# Patient Record
Sex: MALE | Race: WHITE | HISPANIC OR LATINO | Employment: FULL TIME | URBAN - METROPOLITAN AREA
[De-identification: names, ages, dates, MRNs, and addresses within clinical notes are randomized per-mention and may not be internally consistent; named-entity substitution may affect disease eponyms.]

---

## 2019-11-07 ENCOUNTER — HOSPITAL ENCOUNTER (EMERGENCY)
Facility: CLINIC | Age: 24
Discharge: HOME | End: 2019-11-07
Attending: NURSE PRACTITIONER | Admitting: NURSE PRACTITIONER
Payer: OTHER MISCELLANEOUS

## 2019-11-07 ENCOUNTER — APPOINTMENT (OUTPATIENT)
Dept: GENERAL RADIOLOGY | Facility: CLINIC | Age: 24
End: 2019-11-07
Attending: NURSE PRACTITIONER
Payer: OTHER MISCELLANEOUS

## 2019-11-07 VITALS
DIASTOLIC BLOOD PRESSURE: 86 MMHG | TEMPERATURE: 98.2 F | RESPIRATION RATE: 18 BRPM | OXYGEN SATURATION: 98 % | HEIGHT: 66 IN | BODY MASS INDEX: 25.44 KG/M2 | SYSTOLIC BLOOD PRESSURE: 133 MMHG | HEART RATE: 77 BPM

## 2019-11-07 DIAGNOSIS — S61.411A LACERATION OF RIGHT HAND WITHOUT FOREIGN BODY, INITIAL ENCOUNTER: ICD-10-CM

## 2019-11-07 PROCEDURE — 12004 RPR S/N/AX/GEN/TRK7.6-12.5CM: CPT

## 2019-11-07 PROCEDURE — 73130 X-RAY EXAM OF HAND: CPT | Mod: LT

## 2019-11-07 PROCEDURE — 99283 EMERGENCY DEPT VISIT LOW MDM: CPT

## 2019-11-07 ASSESSMENT — ENCOUNTER SYMPTOMS: WOUND: 1

## 2019-11-07 NOTE — ED AVS SNAPSHOT
Emergency Department  [address] [place] [address]  Phone:  [phone]  Fax:  [phone]                                    Paco Ash   MRN: [id number]    Department:   Emergency Department   Date of Visit:  11/7/2019           After Visit Summary Signature Page    I have received my discharge instructions, and my questions have been answered. I have discussed any challenges I see with this plan with the nurse or doctor.    ..........................................................................................................................................  Patient/Patient Representative Signature      ..........................................................................................................................................  Patient Representative Print Name and Relationship to Patient    ..................................................               ................................................  Date                                   Time    ..........................................................................................................................................  Reviewed by Signature/Title    ...................................................              ..............................................  Date                                               Time          22EPIC Rev 08/18

## 2019-11-07 NOTE — ED PROVIDER NOTES
"  History     Chief Complaint:  Laceration    HPI  An  was used as an  for the following HPI.  Paco Ash is a 24 year old male who presents with a laceration. The patient was cutting a countertop made of granite that lacerated his left medial hand while at work today. Here, he states his pain is at a 4/10. He notes numbness to his pinky finger which is just distal to the wound. He denies weakness with extension or flexion.  He has not taken any medication for his symptoms.  The patient's last tetanus shot was in 2015.     Allergies:  The patient has no known drug allergies.    Medications:    The patient is not currently taking any prescribed medications.    Past Medical History:    Respiratory failure     Past Surgical History:    The patient does not have any pertinent past surgical history.    Family History:    No past pertinent family history.    Social History:  The injury occurred while at work   Arrives with: Coworker     Review of Systems   Skin: Positive for wound.   All other systems reviewed and are negative.    Physical Exam     Patient Vitals for the past 24 hrs:   BP Temp Pulse Resp SpO2 Height   11/07/19 1216 131/87 98.2  F (36.8  C) 99 18 98 % 1.676 m (5' 6\")     Physical Exam  Nursing notes reviewed. Vitals reviewed.  General: Alert. Well kept.  Eyes:  Conjunctiva non-injected, non-icteric.  Neck/Throat: Moist mucous membranes. Normal voice.  Cardiac: Regular rhythm. Normal heart sounds.  Pulmonary: Clear and equal breath sounds bilaterally.   Musculoskeletal: Normal gross range of motion of all 4 extremities. Full strength flexion and extension of all 5 fingers at each IP joint and no pain with ROM of wrist.   Neurological: Alert and oriented x4.   Skin: Warm and dry. 8 cm subcutaneous laceration to ulnar aspect of left hand over the 5th metacarpal with no bone or tendon exposure.   Psych: Affect normal. Good eye contact.    Emergency Department Course "   Imaging:  Radiographic findings were communicated with the patient who voiced understanding of the findings.    XR Hand 3 views, left:   No radiopaque foreign body or acute fracture identified. as per radiology.     Procedures:    Procedure: Laceration Repair        LACERATION:  A subcutanous clean 8 cm laceration.      LOCATION:  Left medial hand.       FUNCTION:  Distally sensation, circulation, motor and tendon function are intact.      ANESTHESIA:  Local using 0.5% bupivacaine total of 5 mLs      PREPARATION:  Irrigation and Scrubbing with Normal Saline and Shur Clens      DEBRIDEMENT:  no debridement      CLOSURE:  Wound was closed with One Layer.  Skin closed with 12 x 4.0 Ethylon using interrupted sutures.            Emergency Department Course:  1217 I performed an exam of the patient as documented above.   1440 I rechecked the patient and discussed the results of their workup thus far.     Findings and plan explained. Patient discharged home with instructions regarding supportive care and reasons to return. The importance of close follow-up was reviewed. I personally reviewed the workup results with them and answered all related questions prior to discharge.    Impression & Plan    Medical Decision Making:  Paco Ash is a 24 year old male who presents for a laceration.  The wound was carefully evaluated and explored.  The laceration was closed with sutures as noted herein.  There is no evidence of muscular, tendon, or bony damage with this laceration.  Possible complications (infection, scarring) were reviewed with the patient.  Follow up with primary care will be indicated for suture removal as noted in the discharge section.  I also discussed signs of infection including redness, warmth, foul-smelling drainage and worsening pain and instructed the patient to return promptly to the ER for re-evaluation should any of these develop.  No indication for antibiotics.  He was also given referral  to hand orthopedist due to complex nature of the laceration and his lateral 5th finger paresthesia.     Diagnosis:    ICD-10-CM    1. Laceration of right hand without foreign body, initial encounter S61.411A      Disposition:  discharged to home.    Scribe Disclosure: I, Vin Olsen, am serving as a scribe on 11/7/2019 at 12:17 PM to personally document services performed by Kimi Phan CNP based on my observations and the provider's statements to me.      Kimi Phan CNP  11/07/19 [id number]

## 2019-11-07 NOTE — LETTER
November 7, 2019      To Whom It May Concern:      Paco Ash was seen in our Emergency Department today, 11/07/19. He may not lift >#5 or use left hand until sutures removed in 10-14 days.  He may return to work when improved.    Sincerely,        Kimi Phan, CNP

## 2020-03-10 ENCOUNTER — HEALTH MAINTENANCE LETTER (OUTPATIENT)
Age: 25
End: 2020-03-10

## 2020-12-27 ENCOUNTER — HEALTH MAINTENANCE LETTER (OUTPATIENT)
Age: 25
End: 2020-12-27

## 2021-04-24 ENCOUNTER — HEALTH MAINTENANCE LETTER (OUTPATIENT)
Age: 26
End: 2021-04-24

## 2021-10-04 ENCOUNTER — HEALTH MAINTENANCE LETTER (OUTPATIENT)
Age: 26
End: 2021-10-04

## 2022-05-15 ENCOUNTER — HEALTH MAINTENANCE LETTER (OUTPATIENT)
Age: 27
End: 2022-05-15

## 2022-09-11 ENCOUNTER — HEALTH MAINTENANCE LETTER (OUTPATIENT)
Age: 27
End: 2022-09-11

## 2022-12-10 ENCOUNTER — HOSPITAL ENCOUNTER (INPATIENT)
Facility: CLINIC | Age: 27
LOS: 3 days | Discharge: HOME | DRG: 387 | End: 2022-12-13
Attending: EMERGENCY MEDICINE | Admitting: STUDENT IN AN ORGANIZED HEALTH CARE EDUCATION/TRAINING PROGRAM

## 2022-12-10 ENCOUNTER — APPOINTMENT (OUTPATIENT)
Dept: CT IMAGING | Facility: CLINIC | Age: 27
DRG: 387 | End: 2022-12-10
Attending: EMERGENCY MEDICINE

## 2022-12-10 DIAGNOSIS — R19.7 BLOODY DIARRHEA: ICD-10-CM

## 2022-12-10 DIAGNOSIS — K51.00 PANCOLITIS (H): ICD-10-CM

## 2022-12-10 LAB
ALBUMIN SERPL BCG-MCNC: 4 G/DL (ref 3.5–5.2)
ALP SERPL-CCNC: 98 U/L (ref 40–129)
ALT SERPL W P-5'-P-CCNC: 29 U/L (ref 10–50)
ANION GAP SERPL CALCULATED.3IONS-SCNC: 11 MMOL/L (ref 7–15)
AST SERPL W P-5'-P-CCNC: 30 U/L (ref 10–50)
BASOPHILS # BLD AUTO: 0.1 10E3/UL (ref 0–0.2)
BASOPHILS NFR BLD AUTO: 0 %
BILIRUB SERPL-MCNC: 0.3 MG/DL
BUN SERPL-MCNC: 7.4 MG/DL (ref 6–20)
C DIFF TOX B STL QL: NEGATIVE
C DIFF TOX B STL QL: NEGATIVE
CALCIUM SERPL-MCNC: 9.2 MG/DL (ref 8.6–10)
CHLORIDE SERPL-SCNC: 103 MMOL/L (ref 98–107)
CREAT SERPL-MCNC: 1 MG/DL (ref 0.67–1.17)
CRP SERPL-MCNC: 20.82 MG/L
DEPRECATED HCO3 PLAS-SCNC: 25 MMOL/L (ref 22–29)
EOSINOPHIL # BLD AUTO: 1.5 10E3/UL (ref 0–0.7)
EOSINOPHIL NFR BLD AUTO: 13 %
ERYTHROCYTE [DISTWIDTH] IN BLOOD BY AUTOMATED COUNT: 12.6 % (ref 10–15)
ERYTHROCYTE [SEDIMENTATION RATE] IN BLOOD BY WESTERGREN METHOD: 50 MM/HR (ref 0–15)
FLUAV RNA SPEC QL NAA+PROBE: NEGATIVE
FLUBV RNA RESP QL NAA+PROBE: NEGATIVE
GFR SERPL CREATININE-BSD FRML MDRD: >90 ML/MIN/1.73M2
GLUCOSE SERPL-MCNC: 107 MG/DL (ref 70–99)
HCT VFR BLD AUTO: 44.8 % (ref 40–53)
HGB BLD-MCNC: 14.5 G/DL (ref 13.3–17.7)
IMM GRANULOCYTES # BLD: 0.1 10E3/UL
IMM GRANULOCYTES NFR BLD: 1 %
LACTATE SERPL-SCNC: 0.6 MMOL/L (ref 0.7–2)
LIPASE SERPL-CCNC: 22 U/L (ref 13–60)
LYMPHOCYTES # BLD AUTO: 1.6 10E3/UL (ref 0.8–5.3)
LYMPHOCYTES NFR BLD AUTO: 14 %
MCH RBC QN AUTO: 28.2 PG (ref 26.5–33)
MCHC RBC AUTO-ENTMCNC: 32.4 G/DL (ref 31.5–36.5)
MCV RBC AUTO: 87 FL (ref 78–100)
MONOCYTES # BLD AUTO: 0.7 10E3/UL (ref 0–1.3)
MONOCYTES NFR BLD AUTO: 6 %
NEUTROPHILS # BLD AUTO: 8 10E3/UL (ref 1.6–8.3)
NEUTROPHILS NFR BLD AUTO: 66 %
NRBC # BLD AUTO: 0 10E3/UL
NRBC BLD AUTO-RTO: 0 /100
PLATELET # BLD AUTO: 445 10E3/UL (ref 150–450)
POTASSIUM SERPL-SCNC: 4 MMOL/L (ref 3.4–5.3)
PROT SERPL-MCNC: 7.7 G/DL (ref 6.4–8.3)
RBC # BLD AUTO: 5.15 10E6/UL (ref 4.4–5.9)
RSV RNA SPEC NAA+PROBE: NEGATIVE
SARS-COV-2 RNA RESP QL NAA+PROBE: NEGATIVE
SODIUM SERPL-SCNC: 139 MMOL/L (ref 136–145)
WBC # BLD AUTO: 12 10E3/UL (ref 4–11)

## 2022-12-10 PROCEDURE — 250N000011 HC RX IP 250 OP 636: Performed by: EMERGENCY MEDICINE

## 2022-12-10 PROCEDURE — 120N000001 HC R&B MED SURG/OB

## 2022-12-10 PROCEDURE — 87493 C DIFF AMPLIFIED PROBE: CPT | Performed by: INTERNAL MEDICINE

## 2022-12-10 PROCEDURE — 258N000003 HC RX IP 258 OP 636: Performed by: STUDENT IN AN ORGANIZED HEALTH CARE EDUCATION/TRAINING PROGRAM

## 2022-12-10 PROCEDURE — 86140 C-REACTIVE PROTEIN: CPT | Performed by: STUDENT IN AN ORGANIZED HEALTH CARE EDUCATION/TRAINING PROGRAM

## 2022-12-10 PROCEDURE — 80053 COMPREHEN METABOLIC PANEL: CPT | Performed by: EMERGENCY MEDICINE

## 2022-12-10 PROCEDURE — 85652 RBC SED RATE AUTOMATED: CPT | Performed by: STUDENT IN AN ORGANIZED HEALTH CARE EDUCATION/TRAINING PROGRAM

## 2022-12-10 PROCEDURE — 36415 COLL VENOUS BLD VENIPUNCTURE: CPT | Performed by: STUDENT IN AN ORGANIZED HEALTH CARE EDUCATION/TRAINING PROGRAM

## 2022-12-10 PROCEDURE — 99285 EMERGENCY DEPT VISIT HI MDM: CPT | Mod: 25

## 2022-12-10 PROCEDURE — 87506 IADNA-DNA/RNA PROBE TQ 6-11: CPT | Performed by: STUDENT IN AN ORGANIZED HEALTH CARE EDUCATION/TRAINING PROGRAM

## 2022-12-10 PROCEDURE — 85025 COMPLETE CBC W/AUTO DIFF WBC: CPT | Performed by: EMERGENCY MEDICINE

## 2022-12-10 PROCEDURE — C9803 HOPD COVID-19 SPEC COLLECT: HCPCS

## 2022-12-10 PROCEDURE — 258N000003 HC RX IP 258 OP 636: Performed by: EMERGENCY MEDICINE

## 2022-12-10 PROCEDURE — 83993 ASSAY FOR CALPROTECTIN FECAL: CPT | Performed by: INTERNAL MEDICINE

## 2022-12-10 PROCEDURE — 96360 HYDRATION IV INFUSION INIT: CPT

## 2022-12-10 PROCEDURE — 87637 SARSCOV2&INF A&B&RSV AMP PRB: CPT | Performed by: STUDENT IN AN ORGANIZED HEALTH CARE EDUCATION/TRAINING PROGRAM

## 2022-12-10 PROCEDURE — 87209 SMEAR COMPLEX STAIN: CPT | Performed by: INTERNAL MEDICINE

## 2022-12-10 PROCEDURE — 250N000009 HC RX 250: Performed by: EMERGENCY MEDICINE

## 2022-12-10 PROCEDURE — 36415 COLL VENOUS BLD VENIPUNCTURE: CPT | Performed by: EMERGENCY MEDICINE

## 2022-12-10 PROCEDURE — 74177 CT ABD & PELVIS W/CONTRAST: CPT

## 2022-12-10 PROCEDURE — 83605 ASSAY OF LACTIC ACID: CPT | Performed by: STUDENT IN AN ORGANIZED HEALTH CARE EDUCATION/TRAINING PROGRAM

## 2022-12-10 PROCEDURE — 87493 C DIFF AMPLIFIED PROBE: CPT | Performed by: EMERGENCY MEDICINE

## 2022-12-10 PROCEDURE — 83690 ASSAY OF LIPASE: CPT | Performed by: EMERGENCY MEDICINE

## 2022-12-10 PROCEDURE — 99223 1ST HOSP IP/OBS HIGH 75: CPT | Mod: AI | Performed by: STUDENT IN AN ORGANIZED HEALTH CARE EDUCATION/TRAINING PROGRAM

## 2022-12-10 RX ORDER — ACETAMINOPHEN 325 MG/1
650 TABLET ORAL EVERY 6 HOURS PRN
Status: DISCONTINUED | OUTPATIENT
Start: 2022-12-10 | End: 2022-12-13 | Stop reason: HOSPADM

## 2022-12-10 RX ORDER — NALOXONE HYDROCHLORIDE 0.4 MG/ML
0.2 INJECTION, SOLUTION INTRAMUSCULAR; INTRAVENOUS; SUBCUTANEOUS
Status: DISCONTINUED | OUTPATIENT
Start: 2022-12-10 | End: 2022-12-12

## 2022-12-10 RX ORDER — ONDANSETRON 2 MG/ML
4 INJECTION INTRAMUSCULAR; INTRAVENOUS EVERY 6 HOURS PRN
Status: DISCONTINUED | OUTPATIENT
Start: 2022-12-10 | End: 2022-12-13 | Stop reason: HOSPADM

## 2022-12-10 RX ORDER — NALOXONE HYDROCHLORIDE 0.4 MG/ML
0.4 INJECTION, SOLUTION INTRAMUSCULAR; INTRAVENOUS; SUBCUTANEOUS
Status: DISCONTINUED | OUTPATIENT
Start: 2022-12-10 | End: 2022-12-12

## 2022-12-10 RX ORDER — IOPAMIDOL 755 MG/ML
500 INJECTION, SOLUTION INTRAVASCULAR ONCE
Status: COMPLETED | OUTPATIENT
Start: 2022-12-10 | End: 2022-12-10

## 2022-12-10 RX ORDER — ONDANSETRON 2 MG/ML
4 INJECTION INTRAMUSCULAR; INTRAVENOUS EVERY 30 MIN PRN
Status: DISCONTINUED | OUTPATIENT
Start: 2022-12-10 | End: 2022-12-11

## 2022-12-10 RX ORDER — LOPERAMIDE HCL 2 MG
2 CAPSULE ORAL 4 TIMES DAILY PRN
COMMUNITY

## 2022-12-10 RX ORDER — SODIUM CHLORIDE 9 MG/ML
INJECTION, SOLUTION INTRAVENOUS CONTINUOUS
Status: DISCONTINUED | OUTPATIENT
Start: 2022-12-10 | End: 2022-12-10

## 2022-12-10 RX ORDER — SODIUM CHLORIDE, SODIUM LACTATE, POTASSIUM CHLORIDE, CALCIUM CHLORIDE 600; 310; 30; 20 MG/100ML; MG/100ML; MG/100ML; MG/100ML
INJECTION, SOLUTION INTRAVENOUS CONTINUOUS
Status: DISCONTINUED | OUTPATIENT
Start: 2022-12-10 | End: 2022-12-12

## 2022-12-10 RX ORDER — LIDOCAINE 40 MG/G
CREAM TOPICAL
Status: DISCONTINUED | OUTPATIENT
Start: 2022-12-10 | End: 2022-12-13 | Stop reason: HOSPADM

## 2022-12-10 RX ORDER — PROCHLORPERAZINE 25 MG
25 SUPPOSITORY, RECTAL RECTAL EVERY 12 HOURS PRN
Status: DISCONTINUED | OUTPATIENT
Start: 2022-12-10 | End: 2022-12-13 | Stop reason: HOSPADM

## 2022-12-10 RX ORDER — ACETAMINOPHEN 650 MG/1
650 SUPPOSITORY RECTAL EVERY 6 HOURS PRN
Status: DISCONTINUED | OUTPATIENT
Start: 2022-12-10 | End: 2022-12-13 | Stop reason: HOSPADM

## 2022-12-10 RX ORDER — PROCHLORPERAZINE MALEATE 5 MG
10 TABLET ORAL EVERY 6 HOURS PRN
Status: DISCONTINUED | OUTPATIENT
Start: 2022-12-10 | End: 2022-12-13 | Stop reason: HOSPADM

## 2022-12-10 RX ORDER — MORPHINE SULFATE 4 MG/ML
4 INJECTION, SOLUTION INTRAMUSCULAR; INTRAVENOUS
Status: DISCONTINUED | OUTPATIENT
Start: 2022-12-10 | End: 2022-12-11

## 2022-12-10 RX ORDER — ONDANSETRON 4 MG/1
4 TABLET, ORALLY DISINTEGRATING ORAL EVERY 6 HOURS PRN
Status: DISCONTINUED | OUTPATIENT
Start: 2022-12-10 | End: 2022-12-13 | Stop reason: HOSPADM

## 2022-12-10 RX ADMIN — IOPAMIDOL 70 ML: 755 INJECTION, SOLUTION INTRAVENOUS at 10:25

## 2022-12-10 RX ADMIN — SODIUM CHLORIDE: 9 INJECTION, SOLUTION INTRAVENOUS at 12:50

## 2022-12-10 RX ADMIN — SODIUM CHLORIDE, POTASSIUM CHLORIDE, SODIUM LACTATE AND CALCIUM CHLORIDE: 600; 310; 30; 20 INJECTION, SOLUTION INTRAVENOUS at 15:55

## 2022-12-10 RX ADMIN — SODIUM CHLORIDE 55 ML: 9 INJECTION, SOLUTION INTRAVENOUS at 10:26

## 2022-12-10 RX ADMIN — SODIUM CHLORIDE 1000 ML: 9 INJECTION, SOLUTION INTRAVENOUS at 10:03

## 2022-12-10 RX ADMIN — ONDANSETRON 4 MG: 2 INJECTION INTRAMUSCULAR; INTRAVENOUS at 12:50

## 2022-12-10 ASSESSMENT — ACTIVITIES OF DAILY LIVING (ADL)
DOING_ERRANDS_INDEPENDENTLY_DIFFICULTY: NO
TOILETING_ISSUES: NO
ADLS_ACUITY_SCORE: 20
DIFFICULTY_EATING/SWALLOWING: NO
ADLS_ACUITY_SCORE: 37
WEAR_GLASSES_OR_BLIND: NO
WALKING_OR_CLIMBING_STAIRS_DIFFICULTY: NO
FALL_HISTORY_WITHIN_LAST_SIX_MONTHS: NO
CHANGE_IN_FUNCTIONAL_STATUS_SINCE_ONSET_OF_CURRENT_ILLNESS/INJURY: NO
ADLS_ACUITY_SCORE: 20
ADLS_ACUITY_SCORE: 20
DRESSING/BATHING_DIFFICULTY: NO
ADLS_ACUITY_SCORE: 20
ADLS_ACUITY_SCORE: 20
ADLS_ACUITY_SCORE: 37
CONCENTRATING,_REMEMBERING_OR_MAKING_DECISIONS_DIFFICULTY: NO

## 2022-12-10 ASSESSMENT — ENCOUNTER SYMPTOMS
DIARRHEA: 1
ABDOMINAL PAIN: 1
BLOOD IN STOOL: 1
VOMITING: 1

## 2022-12-10 NOTE — ED NOTES
[facility]  ED Nurse Handoff Report    Paco Ash is a 27 year old male   ED Chief complaint: Abdominal Pain and Nausea, Vomiting, & Diarrhea  . ED Diagnosis:   Final diagnoses:   Pancolitis (H)   Bloody diarrhea     Allergies: No Known Allergies    Code Status: Full Code  Activity level - Baseline/Home:  Independent. Activity Level - Current:   Independent. Lift room needed: No. Bariatric: No   Needed: Yes , some english but requires a Libyan interpretor for new information  Isolation: Yes. Infection: Not Applicable  C-Diff Pending.     Vital Signs:   Vitals:    12/10/22 1223 12/10/22 1238 12/10/22 1245 12/10/22 1253   BP:  120/75 115/71    Pulse:   90    Resp:       Temp:       TempSrc:       SpO2: 100%  99% 98%   Weight:           Cardiac Rhythm:  ,      Pain level:    Patient confused: No. Patient Falls Risk: No.   Elimination Status: Has voided   Patient Report - Initial Complaint: A&O x4, ABCs intact. Pt presents with lower abdominal pain, vomiting and diarrhea. Pt states that the vomiting started yesterday but the pain and diarrhea started about 5 weeks ago. Pt reports some intermittent red blood in toilet bowl. He states he did have some constipation. . Focused Assessment: Gastrointestinal Gastrointestinal WDL: .WDL except; GI symptoms  GI Signs/Symptoms: abdominal discomfort; nausea; vomiting; diarrhea  (intermittent abd pain, nausea, and diarrhea with red blood in some stools)   Tests Performed: labs and imaging. Abnormal Results:   Abnormal Labs Resulted from Time of ED Arrival to Time of ED Departure   COMPREHENSIVE METABOLIC PANEL - Abnormal       Result Value    Sodium 139      Potassium 4.0      Chloride 103      Carbon Dioxide (CO2) 25      Anion Gap 11      Urea Nitrogen 7.4      Creatinine 1.00      Calcium 9.2      Glucose 107 (*)     Alkaline Phosphatase 98      AST 30      ALT 29      Protein Total 7.7      Albumin 4.0      Bilirubin Total 0.3      GFR  Estimate >90     CBC WITH PLATELETS AND DIFFERENTIAL - Abnormal    WBC Count 12.0 (*)     RBC Count 5.15      Hemoglobin 14.5      Hematocrit 44.8      MCV 87      MCH 28.2      MCHC 32.4      RDW 12.6      Platelet Count 445      % Neutrophils 66      % Lymphocytes 14      % Monocytes 6      % Eosinophils 13      % Basophils 0      % Immature Granulocytes 1      NRBCs per 100 WBC 0      Absolute Neutrophils 8.0      Absolute Lymphocytes 1.6      Absolute Monocytes 0.7      Absolute Eosinophils 1.5 (*)     Absolute Basophils 0.1      Absolute Immature Granulocytes 0.1      Absolute NRBCs 0.0     ERYTHROCYTE SEDIMENTATION RATE AUTO - Abnormal    Erythrocyte Sedimentation Rate 50 (*)    CRP INFLAMMATION - Abnormal    CRP Inflammation 20.82 (*)      CT Abdomen Pelvis w Contrast   Final Result   IMPRESSION:    1.  Pancolitis which is either infectious or inflammatory.   2.  Reactive mesorectal and mesenteric lymphadenopathy.           Treatments provided: see MAR  Family Comments: at bedside  OBS brochure/video discussed/provided to patient:  Yes  ED Medications:   Medications   0.9% sodium chloride BOLUS (0 mLs Intravenous Stopped 12/10/22 1116)     Followed by   sodium chloride 0.9% infusion ( Intravenous New Bag 12/10/22 1250)   ondansetron (ZOFRAN) injection 4 mg (4 mg Intravenous Given 12/10/22 1250)   morphine (PF) injection 4 mg (has no administration in time range)   sodium chloride for CT scan flush use (55 mLs Intravenous Given 12/10/22 1026)   iopamidol (ISOVUE-370) solution 500 mL (70 mLs Intravenous Given 12/10/22 1025)     Drips infusing:  No  For the majority of the shift, the patient's behavior Green. Interventions performed were n/a.    Sepsis treatment initiated: No     Patient tested for COVID 19 prior to admission: NO    ED Nurse Name/Phone Number: Joanna Bain RN,   11:01 AM    RECEIVING UNIT ED HANDOFF REVIEW    Above ED Nurse Handoff Report was reviewed: Yes  Reviewed by: Bridget M Reyes  JADON Talbot on December 10, 2022 at 12:46 PM

## 2022-12-10 NOTE — ED TRIAGE NOTES
A&O x4, ABCs intact. Pt presents with lower abdominal pain, vomiting and diarrhea. Pt states that the vomiting started yesterday but the pain and diarrhea started about 5 weeks ago. Pt reports some intermittent red blood in toilet bowl. He states he did have some constipation.        Triage Assessment     Row Name 12/10/22 0923       Triage Assessment (Adult)    Airway WDL WDL       Respiratory WDL    Respiratory WDL WDL       Cardiac WDL    Cardiac WDL X;rhythm    Pulse Rate & Regularity tachycardic

## 2022-12-10 NOTE — CONSULTS
GASTROENTEROLOGY CONSULTATION      Paco Ash  [address] [facility] [address]  27 year old male     Admission Date/Time: 12/10/2022  Primary Care Provider: No Ref-Primary, Physician     We were asked to see the patient in consultation by Dr. Ruano for evaluation of N/V, bloody diarrhea.    ASSESSMENT:    1.  Bloody diarrhea with nausea and vomiting- signs of pancolitis on CT scan.  The diarrhea is of 2-month duration, thus infection less likely, but still possible.  Leading the differential diagnosis, particularly given CRP 20, would be inflammatory bowel disease, ulcerative colitis or perhaps Crohn's.    RECOMMENDATIONS:  - Routine infectious stool panel, ova and parasite x3, C. difficile.  - Fecal calprotectin  - Clear liquid diet  - Monitor hemoglobin, transfuse as necessary  - We will assess patient tomorrow after stabilization today to determine endoscopic approach to diagnosis, colonoscopy versus flexible sigmoidoscopy with upper endoscopy.    Lon Lay MD   [facility]  [phone]      ________________________________________________________________________        HPI:  Paco Ash is a 27 year old male who is previously healthy.  He is Ukrainian-speaking.  The consult was performed with the help of a .  He reports 2 months of chronic daily diarrhea, having 10-15 bowel movements per day, 90% of these contain blood.  He does report associated abdominal cramps with the bowel movements.  He denies fevers, but had a 10 to 15 pound weight loss over this time.  He denies any antibiotics.  There is no NSAID use.  He denies any travel.  He does report day prior to admission he started to have nausea and vomiting, no hematemesis.  He denies any family history of inflammatory bowel disease.     ROS: A comprehensive ten point review of systems was negative aside from those in mentioned in the HPI.      PAST MEDICAL HISTORY:  No past medical  history on file.  PAST SURGICAL HISTORY:  No past surgical history on file.  SOCIAL HISTORY:     FAMILY HISTORY:  No family history on file.  ALLERGIES: No Known Allergies  MEDICATIONS:   Prior to Admission medications    Medication Sig Start Date End Date Taking? Authorizing Provider   loperamide (IMODIUM) 2 MG capsule Take 2 mg by mouth 4 times daily as needed for diarrhea   Yes Unknown, Entered By History     PHYSICAL EXAM:   /71   Pulse 90   Temp 97.6  F (36.4  C) (Oral)   Resp 18   Wt 68 kg (150 lb)   SpO2 98%   BMI 24.21 kg/m     GEN: Alert, NAD.  LILIAN: AT, anicteric, OP without erythema, exudate, or ulcers.    LYMPH: No LAD noted.  HRT: RRR, no SINCERE  LUNGS: CTA  ABD: +BS, non-tender, non-distended, no rebound or guarding.  SKIN: No rash, jaundice   NEURO: MS intact       ADDITIONAL DATA:   I reviewed the patient's new clinical lab test results.   Recent Labs   Lab Test 12/10/22  1002 02/21/16  0345 02/21/16  0115   WBC 12.0* 11.9* 8.5   HGB 14.5 15.8 16.6   MCV 87 86 85    247 302   INR  --  1.01  --      Recent Labs   Lab Test 12/10/22  1002 02/21/16  0345 02/21/16  0115    143 143   POTASSIUM 4.0 4.1 3.7   CHLORIDE 103 110* 111*   CO2 25 24 25   BUN 7.4 7 9   CR 1.00 0.60* 0.66   ANIONGAP 11 9 7   YASSINE 9.2 7.5* 8.3*   * 118* 90     Recent Labs   Lab Test 12/10/22  1002 02/21/16 0345 02/21/16  0245 02/21/16  0115   ALBUMIN 4.0 3.8  --  4.2   BILITOTAL 0.3 0.4  --  0.3   ALT 29 23  --  23   AST 30 21  --  20   PROTEIN  --   --  Negative  --    LIPASE 22  --   --  170        I reviewed the patient's new imaging results.     EXAM: CT ABDOMEN PELVIS W CONTRAST  LOCATION: [facility]  DATE/TIME: 12/10/2022 10:34 AM     INDICATION: abd pain and vomitng  COMPARISON: None.  TECHNIQUE: CT scan of the abdomen and pelvis was performed following injection of IV contrast. Multiplanar reformats were obtained. Dose reduction techniques were used.  CONTRAST: 70mL Isovue  370     FINDINGS:   LOWER CHEST: Trace dependent atelectasis.     HEPATOBILIARY: Normal.     PANCREAS: Normal.     SPLEEN: Normal.     ADRENAL GLANDS: Normal.     KIDNEYS/BLADDER: Normal.     BOWEL: Diffuse colonic wall thickening, greatest in the rectosigmoid region, is consistent with colitis. No obstruction. Normal appendix.     LYMPH NODES: Mesorectal lymphadenopathy with the largest node measuring 0.8 cm in short axis, image 174:2. Multiple small perirectal and mesenteric lymph nodes which are likely reactive.     VASCULATURE: Unremarkable.     PELVIC ORGANS: Normal.     MUSCULOSKELETAL: Normal.                                                                      IMPRESSION:   1.  Pancolitis which is either infectious or inflammatory.  2.  Reactive mesorectal and mesenteric lymphadenopathy.

## 2022-12-10 NOTE — H&P
[facility]  Hospitalist Admission Note  Name: Paco Ash    MRN: [id number]  YOB: 1995    Age: 27 year old  Date of admission: 12/10/2022  Primary care provider: No Ref-Primary, Physician    Chief Complaint: Hematochezia, diarrhea, vomiting    Assessment and Plan:   N/V/D  Hematochezia  Pancolitis possibly related to inflammatory bowel disease:  2 months of watery diarrhea.  Intermittently with blood in the stool, toilet bowl, and on toilet paper.  Has developed associated nausea and emesis over the past 24 to 48 hours.  Denies fevers but does endorse profuse diaphoresis at times.  Endorses 10 pound weight loss as well as significant night sweats.  CT abdomen pelvis shows pancolitis.  CRP and ESR are both elevated.The fact that this is been present for 2 months makes infectious etiology less likely.  Inflammatory bowel of high consideration.  No anion gap metabolic acidosis that would make me consider ischemic bowel.  -GI consulted, appreciate recommendations  -Follow-up C. difficile, GI panel  -Clear liquid diet  -No indication for antibiotics at this time  -LR at 100 cc/h  -Tylenol for pain/fever  -Zofran for nausea/emesis  -Obtain lactic acid to ensure normal      DVT Prophylaxis: Pneumatic Compression Devices  Code Status: Full Code  Discharge Dispo: Patient will discharge home once improved  Estimated Disch Date / # of Days until Disch: Anticipate at least 2 to 3 days in the hospital pending improvement in ability to tolerate diet, improvement in diarrhea, GI work-up.      History of Present Illness:  Paco Ash is a 27 year old Setswana-speaking male with no significant past medical history who presents with 2 months of watery diarrhea, hematochezia, emesis.    Patient reports that approximately 2 months ago he developed watery diarrhea.  He reports that this is been present for 2 straight months now.  He reports that he has approximately 5-10 bowel  movements every single day.  He reports that there is blood occasionally in the stool as well as in the toilet bowl.  He also endorses having blood on the toilet paper when wiping.  He denies any overt abdominal pain but does endorse having some discomfort leading up to bowel movements.  This pain is relieved following completion of bowel movement.  He more recently has developed emesis over the past 24 to 48 hours.  His emesis is nonbloody.  He endorses 10 pound weight loss over 2 months.  He also endorses night sweats waking him up requiring him to change his clothes.     [place].  The last time he has been out of the [place] was 3 years ago.  Denies any history of same in the past.  Denies eating any spoiled or rotten foods.  Denies any family history of autoimmune illnesses or inflammatory bowel disease.    ED work-up is with largely normal vitals apart from a tachycardia to 119.  He is afebrile.  He is saturating fine on room air.  BMP is normal.  LFTs are normal.  Lipase is normal.  WBC is elevated at 12.  CBC is otherwise normal.  CRP is elevated at 20.82 and ESR is elevated at 50.  His CT A/P is notable for pancolitis with reactive mesorectal and mesenteric lymphadenopathy.  C. difficile and GI panel were collected and are pending.  I was asked admit the patient to the hospital given inability to tolerate p.o. intake and for further evaluation of hematochezia and pancolitis.     Past Medical History:  Patient denies any past medical history    Past Surgical History:  Patient denies any past surgical history    Social History:  Tobacco use: None   Alcohol use: Occasionally he will drink 2 drinks per day     Family History:  Patient denies any significant family history.  No family history of inflammatory bowel disease or autoimmune disorders    Allergies:  No Known Allergies     Medications:  Patient does not take any medications prior to admission.    Review of  Systems:  A Comprehensive greater than 10 system review of systems was carried out.  Pertinent positives and negatives are noted above.  Otherwise negative for contributory information.     Physical Exam:  Blood pressure (!) 146/92, pulse 119, temperature 97.6  F (36.4  C), temperature source Oral, resp. rate 18, weight 68 kg (150 lb), SpO2 100 %.  Wt Readings from Last 1 Encounters:   12/10/22 68 kg (150 lb)     Exam:  General: Alert, awake, no acute distress.  Vomiting when entering the room.   HEENT: NC/AT, eyes anicteric, external occular movements intact, face symmetric.    Cardiac: mild tachycardia that is regular, S1, S2.  No murmurs appreciated.  Pulmonary: Normal chest rise, normal work of breathing.  Lungs CTA BL  Abdomen: soft, non-tender, non-distended.  Bowel Sounds Present.  No guarding.  Extremities: no deformities.  Warm, well perfused.  Skin: no rashes or lesions noted.  Warm and Dry.  Neuro: No focal deficits noted.  Speech clear.  Coordination and strength grossly normal.  Psych: Appropriate affect.    Data:  EKG:  None that I can see  Imaging:  Recent Results (from the past 48 hour(s))   CT Abdomen Pelvis w Contrast    Narrative    EXAM: CT ABDOMEN PELVIS W CONTRAST  LOCATION: [facility]  DATE/TIME: 12/10/2022 10:34 AM    INDICATION: abd pain and vomitng  COMPARISON: None.  TECHNIQUE: CT scan of the abdomen and pelvis was performed following injection of IV contrast. Multiplanar reformats were obtained. Dose reduction techniques were used.  CONTRAST: 70mL Isovue 370    FINDINGS:   LOWER CHEST: Trace dependent atelectasis.    HEPATOBILIARY: Normal.    PANCREAS: Normal.    SPLEEN: Normal.    ADRENAL GLANDS: Normal.    KIDNEYS/BLADDER: Normal.    BOWEL: Diffuse colonic wall thickening, greatest in the rectosigmoid region, is consistent with colitis. No obstruction. Normal appendix.    LYMPH NODES: Mesorectal lymphadenopathy with the largest node measuring 0.8 cm in short axis,  image 174:2. Multiple small perirectal and mesenteric lymph nodes which are likely reactive.    VASCULATURE: Unremarkable.    PELVIC ORGANS: Normal.    MUSCULOSKELETAL: Normal.      Impression    IMPRESSION:   1.  Pancolitis which is either infectious or inflammatory.  2.  Reactive mesorectal and mesenteric lymphadenopathy.     Labs:  Recent Labs   Lab 12/10/22  1002   WBC 12.0*   HGB 14.5   HCT 44.8   MCV 87             Lab Results   Component Value Date     12/10/2022     02/21/2016     02/21/2016    Lab Results   Component Value Date    CHLORIDE 103 12/10/2022    CHLORIDE 110 02/21/2016    CHLORIDE 111 02/21/2016    Lab Results   Component Value Date    BUN 7.4 12/10/2022    BUN 7 02/21/2016    BUN 9 02/21/2016      Lab Results   Component Value Date    POTASSIUM 4.0 12/10/2022    POTASSIUM 4.1 02/21/2016    POTASSIUM 3.7 02/21/2016    Lab Results   Component Value Date    CO2 25 12/10/2022    CO2 24 02/21/2016    CO2 25 02/21/2016    Lab Results   Component Value Date    CR 1.00 12/10/2022    CR 0.60 02/21/2016    CR 0.66 02/21/2016        Recent Labs   Lab 12/10/22  1002   SED 50*   CRP 20.82*     DO Mamie Crisostomo  [facility]

## 2022-12-10 NOTE — PHARMACY-ADMISSION MEDICATION HISTORY
Admission medication history interview status for this patient is complete. See [facility] admission navigator for allergy information, prior to admission medications and immunization status.     Medication history interview done, indicate source(s): Family, Wife  Medication history resources (including written lists, pill bottles, clinic record):None  Pharmacy: [id number] - [place], [place] - [address] [place] [address] [address]      Changes made to PTA medication list:  Added: Loperamide  Changed: N/A  Reported as Not Taking: N/A  Removed: N/A    Actions taken by pharmacist (provider contacted, etc):None     Additional medication history information:None    Medication reconciliation/reorder completed by provider prior to medication history?  N   (Y/N)     Prior to Admission medications    Medication Sig Last Dose Taking? Auth Provider Long Term End Date   loperamide (IMODIUM) 2 MG capsule Take 2 mg by mouth 4 times daily as needed for diarrhea Past Week at PRN Yes Unknown, Entered By History

## 2022-12-10 NOTE — PROGRESS NOTES
Arrived to room (620) from ER at (1330 ) via cart, alert and oriented x 4, oriented to room and call system, IV patent and infusing, reviewed welcome folder and pain/medication information packet with patient and (wife). Admission profile started/completed.      NS@125, RA, Ind in room, Stool sample pending, Clear liquid diet

## 2022-12-10 NOTE — ED PROVIDER NOTES
History   Chief Complaint:  Abdominal Pain and Nausea, Vomiting, & Diarrhea       The history is provided by the patient and the spouse. No  was used.      Paco Ash is a 27 year old male, otherwise healthy, who presents with lower abdominal pain and bloody diarrhea since 2 months ago with new onset of emesis yesterday. He reports he has had multiple episodes of bloody diarrhea each day (x10) since onset with abdominal pain. He has been seen by a clinician who placed him on loperamide without relief. He denies any travel outside of the [place], use of antibiotics, and consumption after unclean or abnormal food or water. Yesterday, he began having episodes of emesis for the first time since onset of other symptoms, prompting his arrival to the ED today.     Review of Systems   Gastrointestinal: Positive for abdominal pain, blood in stool, diarrhea and vomiting.   All other systems reviewed and are negative.        Allergies:  No Known Allergies    Medications:  No current daily medications on file.    Past Medical History:     Respiratory failure     Social History:  The patient presents with his wife  The patient presents in a private vehicle     Physical Exam     Patient Vitals for the past 24 hrs:   BP Temp Temp src Pulse Resp SpO2 Weight   12/10/22 0924 (!) 146/92 97.6  F (36.4  C) Oral 119 18 100 % 68 kg (150 lb)       Physical Exam  Vitals: reviewed by me  General: Pt seen on [facility]", formerly [facility], cooperative, and alert to conversation  Eyes: Tracking well, clear conjunctiva BL  ENT: MMM, midline trachea.   Lungs: No tachypnea, no accessory muscle use. No respiratory distress.   CV: Rate as above  Abd: Soft, diffusely tender in the abdomen, no specific focality of tenderness, no guarding.  No rebound.  MSK: no joint effusion.  No evidence of trauma  Skin: No rash  Neuro: Clear speech and no facial droop.  Psych: Not RIS, no e/o AH/VH      Emergency Department  Course     Imaging:  CT Abdomen Pelvis w Contrast   Final Result   IMPRESSION:    1.  Pancolitis which is either infectious or inflammatory.   2.  Reactive mesorectal and mesenteric lymphadenopathy.        Report per radiology    Laboratory:  Labs Ordered and Resulted from Time of ED Arrival to Time of ED Departure   COMPREHENSIVE METABOLIC PANEL - Abnormal       Result Value    Sodium 139      Potassium 4.0      Chloride 103      Carbon Dioxide (CO2) 25      Anion Gap 11      Urea Nitrogen 7.4      Creatinine 1.00      Calcium 9.2      Glucose 107 (*)     Alkaline Phosphatase 98      AST 30      ALT 29      Protein Total 7.7      Albumin 4.0      Bilirubin Total 0.3      GFR Estimate >90     CBC WITH PLATELETS AND DIFFERENTIAL - Abnormal    WBC Count 12.0 (*)     RBC Count 5.15      Hemoglobin 14.5      Hematocrit 44.8      MCV 87      MCH 28.2      MCHC 32.4      RDW 12.6      Platelet Count 445      % Neutrophils 66      % Lymphocytes 14      % Monocytes 6      % Eosinophils 13      % Basophils 0      % Immature Granulocytes 1      NRBCs per 100 WBC 0      Absolute Neutrophils 8.0      Absolute Lymphocytes 1.6      Absolute Monocytes 0.7      Absolute Eosinophils 1.5 (*)     Absolute Basophils 0.1      Absolute Immature Granulocytes 0.1      Absolute NRBCs 0.0     LIPASE - Normal    Lipase 22     C. DIFFICILE TOXIN B PCR WITH REFLEX TO C. DIFFICILE ANTIGEN AND TOXINS A/B EIA        Emergency Department Course:    Reviewed:  I reviewed nursing notes, vitals and past medical history    Assessments:  0937 I obtained history and examined the patient as noted above.   1106 I rechecked the patient and explained findings.     Consults:  1133 I consulted with Dr. Ruano, hospitalist, regarding the patient's history and presentation here in the emergency department who accepted the patient for admission.    Interventions:  1003 NS 1 L IV    Disposition:  The patient was admitted to the hospital under the care of Dr. Ruano.      Impression & Plan     Medical Decision Making:  This is a very pleasant 27-year-old male who presents to the emergency room with what appears to be 2 months of body diarrhea.  No clear antecedent cause, no recent antibiotics or travel outside the [place].  His CT scan does show pancolitis, which certainly raises the possibility of C. difficile or inflammatory bowel disease.  It does not sound like he has any obvious history of autoimmune diseases, but I do think that he should be admitted to the hospital given the degree of the findings on his CT scan.  He will need inflammatory markers sent, stool cultures and C. difficile toxin as well, and will be admitted to the care of Dr. Ruano of the hospitalist team was very generously agreed to accept care of the patient.      Diagnosis:    ICD-10-CM    1. Pancolitis (H)  K51.00       2. Bloody diarrhea  R19.7             Scribe Disclosure:  Ashley LAURENT, am serving as a scribe at 9:43 AM on 12/10/2022 to document services personally performed by Italo Laws MD based on my observations and the provider's statements to me.          Italo Laws MD  12/10/22 7086

## 2022-12-11 LAB
ALBUMIN SERPL BCG-MCNC: 3.2 G/DL (ref 3.5–5.2)
ALP SERPL-CCNC: 76 U/L (ref 40–129)
ALT SERPL W P-5'-P-CCNC: 34 U/L (ref 10–50)
ANION GAP SERPL CALCULATED.3IONS-SCNC: 8 MMOL/L (ref 7–15)
AST SERPL W P-5'-P-CCNC: 42 U/L (ref 10–50)
BILIRUB SERPL-MCNC: 0.3 MG/DL
BUN SERPL-MCNC: 5 MG/DL (ref 6–20)
C COLI+JEJUNI+LARI FUSA STL QL NAA+PROBE: NOT DETECTED
CALCIUM SERPL-MCNC: 8.7 MG/DL (ref 8.6–10)
CHLORIDE SERPL-SCNC: 106 MMOL/L (ref 98–107)
CREAT SERPL-MCNC: 1.01 MG/DL (ref 0.67–1.17)
DEPRECATED HCO3 PLAS-SCNC: 25 MMOL/L (ref 22–29)
EC STX1 GENE STL QL NAA+PROBE: NOT DETECTED
EC STX2 GENE STL QL NAA+PROBE: NOT DETECTED
ERYTHROCYTE [DISTWIDTH] IN BLOOD BY AUTOMATED COUNT: 12.8 % (ref 10–15)
GFR SERPL CREATININE-BSD FRML MDRD: >90 ML/MIN/1.73M2
GLUCOSE SERPL-MCNC: 93 MG/DL (ref 70–99)
HCT VFR BLD AUTO: 39.9 % (ref 40–53)
HGB BLD-MCNC: 12.5 G/DL (ref 13.3–17.7)
MCH RBC QN AUTO: 28 PG (ref 26.5–33)
MCHC RBC AUTO-ENTMCNC: 31.3 G/DL (ref 31.5–36.5)
MCV RBC AUTO: 89 FL (ref 78–100)
NOROV GI+II ORF1-ORF2 JNC STL QL NAA+PR: NOT DETECTED
PLATELET # BLD AUTO: 383 10E3/UL (ref 150–450)
POTASSIUM SERPL-SCNC: 4.2 MMOL/L (ref 3.4–5.3)
PROT SERPL-MCNC: 6.3 G/DL (ref 6.4–8.3)
RBC # BLD AUTO: 4.47 10E6/UL (ref 4.4–5.9)
RVA NSP5 STL QL NAA+PROBE: NOT DETECTED
SALMONELLA SP RPOD STL QL NAA+PROBE: NOT DETECTED
SHIGELLA SP+EIEC IPAH STL QL NAA+PROBE: NOT DETECTED
SODIUM SERPL-SCNC: 139 MMOL/L (ref 136–145)
V CHOL+PARA RFBL+TRKH+TNAA STL QL NAA+PR: NOT DETECTED
WBC # BLD AUTO: 12.8 10E3/UL (ref 4–11)
Y ENTERO RECN STL QL NAA+PROBE: NOT DETECTED

## 2022-12-11 PROCEDURE — 258N000003 HC RX IP 258 OP 636: Performed by: STUDENT IN AN ORGANIZED HEALTH CARE EDUCATION/TRAINING PROGRAM

## 2022-12-11 PROCEDURE — 250N000013 HC RX MED GY IP 250 OP 250 PS 637: Performed by: INTERNAL MEDICINE

## 2022-12-11 PROCEDURE — 80053 COMPREHEN METABOLIC PANEL: CPT | Performed by: INTERNAL MEDICINE

## 2022-12-11 PROCEDURE — 99233 SBSQ HOSP IP/OBS HIGH 50: CPT | Performed by: STUDENT IN AN ORGANIZED HEALTH CARE EDUCATION/TRAINING PROGRAM

## 2022-12-11 PROCEDURE — 120N000001 HC R&B MED SURG/OB

## 2022-12-11 PROCEDURE — 36415 COLL VENOUS BLD VENIPUNCTURE: CPT | Performed by: INTERNAL MEDICINE

## 2022-12-11 PROCEDURE — 85027 COMPLETE CBC AUTOMATED: CPT | Performed by: STUDENT IN AN ORGANIZED HEALTH CARE EDUCATION/TRAINING PROGRAM

## 2022-12-11 RX ADMIN — SODIUM CHLORIDE, POTASSIUM CHLORIDE, SODIUM LACTATE AND CALCIUM CHLORIDE: 600; 310; 30; 20 INJECTION, SOLUTION INTRAVENOUS at 01:31

## 2022-12-11 RX ADMIN — SODIUM CHLORIDE, POTASSIUM CHLORIDE, SODIUM LACTATE AND CALCIUM CHLORIDE: 600; 310; 30; 20 INJECTION, SOLUTION INTRAVENOUS at 20:58

## 2022-12-11 RX ADMIN — POLYETHYLENE GLYCOL 3350, SODIUM SULFATE ANHYDROUS, SODIUM BICARBONATE, SODIUM CHLORIDE, POTASSIUM CHLORIDE 4000 ML: 236; 22.74; 6.74; 5.86; 2.97 POWDER, FOR SOLUTION ORAL at 17:37

## 2022-12-11 ASSESSMENT — ACTIVITIES OF DAILY LIVING (ADL)
ADLS_ACUITY_SCORE: 20

## 2022-12-11 NOTE — PROGRESS NOTES
"  GASTROENTEROLOGY PROGRESS NOTE     IMPRESSION:  1.  Bloody diarrhea with pancolitis on CT- duration of history and negative infectious work-up, suggest underlying inflammatory bowel disease.  His nausea and vomiting is resolved overnight, thus we will pursue colonoscopy prep for full colonoscopy tomorrow.  We will add an upper endoscopy, given the nausea and vomiting initially.    RECOMMENDATIONS:  - Clear diet today, n.p.o. at midnight  - Upper endoscopy with colonoscopy tomorrow  - Await fecal calprotectin and ova and parasite stool studies    Lon Lay MD  [facility]  [phone]      ________________________________________________________________________      SUBJECTIVE:  Patient reports he feels better, nausea and vomiting completely resolved.  He had 6 stools overnight 50% of which contain blood.       OBJECTIVE:  /60 (BP Location: Right arm)   Pulse 75   Temp 97.6  F (36.4  C) (Temporal)   Resp 16   Ht 1.676 m (5' 6\")   Wt 77.2 kg (170 lb 3.2 oz)   SpO2 96%   BMI 27.47 kg/m    Temp (24hrs), Av.9  F (36.6  C), Min:97.6  F (36.4  C), Max:98.1  F (36.7  C)    Patient Vitals for the past 72 hrs:   Weight   22 0700 77.2 kg (170 lb 3.2 oz)   12/10/22 1339 78.1 kg (172 lb 1.6 oz)   12/10/22 0924 68 kg (150 lb)        PHYSICAL EXAM  GEN: Alert, NAD.    HRT: reg  LUNGS: CTA  ABD: +BS, non-tender, non-distended, no rebound or guarding.    Additional Data:  I have reviewed the patient's new clinical lab results:     Recent Labs   Lab Test 22  0648 12/10/22  1002 16  0345   WBC 12.8* 12.0* 11.9*   HGB 12.5* 14.5 15.8   MCV 89 87 86    445 247   INR  --   --  1.01     Recent Labs   Lab Test 22  0648 12/10/22  1002 16  0345    139 143   POTASSIUM 4.2 4.0 4.1   CHLORIDE 106 103 110*   CO2 25 25 24   BUN 5.0* 7.4 7   CR 1.01 1.00 0.60*   ANIONGAP 8 11 9   YASSINE 8.7 9.2 7.5*   GLC 93 107* 118*     Recent Labs   Lab Test 22  0648 " 12/10/22  1002 02/21/16  0345 02/21/16  0245 02/21/16  0115   ALBUMIN 3.2* 4.0 3.8  --  4.2   BILITOTAL 0.3 0.3 0.4  --  0.3   ALT 34 29 23  --  23   AST 42 30 21  --  20   PROTEIN  --   --   --  Negative  --    LIPASE  --  22  --   --  170

## 2022-12-11 NOTE — PLAN OF CARE
Goal Outcome Evaluation: [id number] RN   2 stools this evening. Watery brown diarrhea. Tolerating clear liquids well. Abd soft. Good bowel sounds. No nausea.  phone at the bedside. No edema. IV fluids. Awaiting stool spec results. Pleasant mood. No pain. Independent to the bathroom.

## 2022-12-11 NOTE — PROGRESS NOTES
Ind in room, RA, LR@100, no n/v, clear liquid diet, denies pain    Pt showered, increased mobility throughout the morning in room. Enteric precautions discontinued per provider    GI saw pt today - NPO @ 0000  Plan for endoscopy and colonoscopy tomorrow 12/12, time TBD

## 2022-12-11 NOTE — PLAN OF CARE
Goal Outcome Evaluation:      Plan of Care Reviewed With: patient    Overall Patient Progress: no changeOverall Patient Progress: no change     Pt A/O x4. [place] speaking.  phone in room. Pleasant mood. 3 stools over night. Watery brown diarrhea. Tolerating clear liquids well. Independent in the room. PIV Infusing. VSS. Good bowel sounds. No nausea. No edema. Pending lab results. Will continue to monitor.

## 2022-12-11 NOTE — PROGRESS NOTES
[facility]  Hospitalist Progress Note  Brendan Ruano,  12/11/22       Reason for Stay (Diagnosis): N/V/D, hematochezia         Assessment and Plan:      Summary of Stay: Paco Ash is a 27 year old Chinese-speaking male with no significant past medical history who presents with 2 months of watery diarrhea, hematochezia, emesis.    FTH pancolitis on CT a/p.  Infectious w/u negative.  Inflammatory markers elevated so ongoing concern for inflammatory bowel disease.  GI consulted and consideration for scope to assess etiology of pancolitis.     Problem List/Assessment and Plan:   N/V/D  Hematochezia  Pancolitis possibly related to inflammatory bowel disease:  2 months of watery diarrhea.  Intermittently with blood in the stool, toilet bowl, and on toilet paper.  Has developed associated nausea and emesis over 24 to 48 hours PTA.  Denies fevers but does endorse profuse diaphoresis at times.  Endorses 10 pound weight loss as well as significant night sweats.  CT abdomen pelvis shows pancolitis.  CRP and ESR are both elevated.  Inflammatory bowel of high consideration.  GI panel, cdiff negative.  Lactic acid normal.   -GI consulted, appreciate recommendations   -Consideration for colonoscopy vs flex/sig + EGD  -Clear liquid diet until scope plan has been determined by GI  -No indication for antibiotics at this time  -LR at 100 cc/h  -Tylenol for pain/fever  -Zofran for nausea/emesis      DVT Prophylaxis: Pneumatic Compression Devices  Code Status: Full Code  Discharge Dispo/Date: Anticipate discharge in the next 1-2 days pending GI recommendations, scope plan.         Interval History (Subjective):      Patient reports improvement in nausea and vomiting.  Tolerating clear liquid diet.  Ongoing watery diarrhea with intermittent blood.  Patient reports 10 BMs since admission yesterday.  This does not seem to have improved.  No abdominal pain.  Patient and spouse were updated to the plan.  There  "questions were answered.                   Physical Exam:      Last Vital Signs:  /60 (BP Location: Right arm)   Pulse 75   Temp 97.6  F (36.4  C) (Temporal)   Resp 16   Ht 1.676 m (5' 6\")   Wt 77.2 kg (170 lb 3.2 oz)   SpO2 96%   BMI 27.47 kg/m        Intake/Output Summary (Last 24 hours) at 12/11/2022 1136  Last data filed at 12/11/2022 1007  Gross per 24 hour   Intake 1110 ml   Output 200 ml   Net 910 ml       General: Alert, awake, no acute distress.  HEENT: NC/AT, eyes anicteric, external occular movements intact, face symmetric.  Dentition WNL, MM moist.  Cardiac: RRR, S1, S2.  No murmurs appreciated.  Pulmonary: Normal work of breathing.  Lungs CTAB.  Abdomen: soft, non-tender, non-distended.  Bowel sounds present.  No guarding.  Extremities: no deformities.  Warm, well perfused.  Skin: no rashes or lesions noted.  Warm and dry.  Neuro: No gross deficits noted.  Speech clear.    Psych: Appropriate affect.         Medications:      All current medications were reviewed with changes reflected in problem list.         Data:      All new lab and imaging data was reviewed.   Labs:       Lab Results   Component Value Date     12/11/2022     12/10/2022     02/21/2016     02/21/2016    Lab Results   Component Value Date    CHLORIDE 106 12/11/2022    CHLORIDE 103 12/10/2022    CHLORIDE 110 02/21/2016    CHLORIDE 111 02/21/2016    Lab Results   Component Value Date    BUN 5.0 12/11/2022    BUN 7.4 12/10/2022    BUN 7 02/21/2016    BUN 9 02/21/2016      Lab Results   Component Value Date    POTASSIUM 4.2 12/11/2022    POTASSIUM 4.0 12/10/2022    POTASSIUM 4.1 02/21/2016    POTASSIUM 3.7 02/21/2016    Lab Results   Component Value Date    CO2 25 12/11/2022    CO2 25 12/10/2022    CO2 24 02/21/2016    CO2 25 02/21/2016    Lab Results   Component Value Date    CR 1.01 12/11/2022    CR 1.00 12/10/2022    CR 0.60 02/21/2016    CR 0.66 02/21/2016        Recent Labs   Lab 12/11/22  0648 "   WBC 12.8*   HGB 12.5*   HCT 39.9*   MCV 89         Imaging:   Recent Results (from the past 48 hour(s))   CT Abdomen Pelvis w Contrast    Narrative    EXAM: CT ABDOMEN PELVIS W CONTRAST  LOCATION: [facility]  DATE/TIME: 12/10/2022 10:34 AM    INDICATION: abd pain and vomitng  COMPARISON: None.  TECHNIQUE: CT scan of the abdomen and pelvis was performed following injection of IV contrast. Multiplanar reformats were obtained. Dose reduction techniques were used.  CONTRAST: 70mL Isovue 370    FINDINGS:   LOWER CHEST: Trace dependent atelectasis.    HEPATOBILIARY: Normal.    PANCREAS: Normal.    SPLEEN: Normal.    ADRENAL GLANDS: Normal.    KIDNEYS/BLADDER: Normal.    BOWEL: Diffuse colonic wall thickening, greatest in the rectosigmoid region, is consistent with colitis. No obstruction. Normal appendix.    LYMPH NODES: Mesorectal lymphadenopathy with the largest node measuring 0.8 cm in short axis, image 174:2. Multiple small perirectal and mesenteric lymph nodes which are likely reactive.    VASCULATURE: Unremarkable.    PELVIC ORGANS: Normal.    MUSCULOSKELETAL: Normal.      Impression    IMPRESSION:   1.  Pancolitis which is either infectious or inflammatory.  2.  Reactive mesorectal and mesenteric lymphadenopathy.       Brendan Ruano,

## 2022-12-12 LAB
CALPROTECTIN STL-MCNT: 138 MG/KG (ref 0–49.9)
COLONOSCOPY: NORMAL
ERYTHROCYTE [DISTWIDTH] IN BLOOD BY AUTOMATED COUNT: 12.8 % (ref 10–15)
HCT VFR BLD AUTO: 39.9 % (ref 40–53)
HGB BLD-MCNC: 12.4 G/DL (ref 13.3–17.7)
MCH RBC QN AUTO: 28.1 PG (ref 26.5–33)
MCHC RBC AUTO-ENTMCNC: 31.1 G/DL (ref 31.5–36.5)
MCV RBC AUTO: 91 FL (ref 78–100)
O+P STL MICRO: NEGATIVE
PLATELET # BLD AUTO: 371 10E3/UL (ref 150–450)
RBC # BLD AUTO: 4.41 10E6/UL (ref 4.4–5.9)
TRI STN SPEC: NORMAL
UPPER GI ENDOSCOPY: NORMAL
WBC # BLD AUTO: 11 10E3/UL (ref 4–11)

## 2022-12-12 PROCEDURE — 88305 TISSUE EXAM BY PATHOLOGIST: CPT | Mod: 26 | Performed by: PATHOLOGY

## 2022-12-12 PROCEDURE — 85041 AUTOMATED RBC COUNT: CPT | Performed by: STUDENT IN AN ORGANIZED HEALTH CARE EDUCATION/TRAINING PROGRAM

## 2022-12-12 PROCEDURE — 99153 MOD SED SAME PHYS/QHP EA: CPT | Performed by: INTERNAL MEDICINE

## 2022-12-12 PROCEDURE — 250N000009 HC RX 250: Performed by: INTERNAL MEDICINE

## 2022-12-12 PROCEDURE — 88342 IMHCHEM/IMCYTCHM 1ST ANTB: CPT | Mod: 26 | Performed by: PATHOLOGY

## 2022-12-12 PROCEDURE — 88305 TISSUE EXAM BY PATHOLOGIST: CPT | Mod: TC | Performed by: INTERNAL MEDICINE

## 2022-12-12 PROCEDURE — 258N000003 HC RX IP 258 OP 636: Performed by: STUDENT IN AN ORGANIZED HEALTH CARE EDUCATION/TRAINING PROGRAM

## 2022-12-12 PROCEDURE — 250N000011 HC RX IP 250 OP 636: Performed by: INTERNAL MEDICINE

## 2022-12-12 PROCEDURE — 45380 COLONOSCOPY AND BIOPSY: CPT | Performed by: INTERNAL MEDICINE

## 2022-12-12 PROCEDURE — 99232 SBSQ HOSP IP/OBS MODERATE 35: CPT | Performed by: HOSPITALIST

## 2022-12-12 PROCEDURE — 120N000001 HC R&B MED SURG/OB

## 2022-12-12 PROCEDURE — 36415 COLL VENOUS BLD VENIPUNCTURE: CPT | Performed by: STUDENT IN AN ORGANIZED HEALTH CARE EDUCATION/TRAINING PROGRAM

## 2022-12-12 PROCEDURE — 0DB68ZX EXCISION OF STOMACH, VIA NATURAL OR ARTIFICIAL OPENING ENDOSCOPIC, DIAGNOSTIC: ICD-10-PCS | Performed by: INTERNAL MEDICINE

## 2022-12-12 PROCEDURE — 85027 COMPLETE CBC AUTOMATED: CPT | Performed by: STUDENT IN AN ORGANIZED HEALTH CARE EDUCATION/TRAINING PROGRAM

## 2022-12-12 PROCEDURE — 0DB98ZX EXCISION OF DUODENUM, VIA NATURAL OR ARTIFICIAL OPENING ENDOSCOPIC, DIAGNOSTIC: ICD-10-PCS | Performed by: INTERNAL MEDICINE

## 2022-12-12 PROCEDURE — G0500 MOD SEDAT ENDO SERVICE >5YRS: HCPCS | Performed by: INTERNAL MEDICINE

## 2022-12-12 PROCEDURE — 43239 EGD BIOPSY SINGLE/MULTIPLE: CPT | Performed by: INTERNAL MEDICINE

## 2022-12-12 PROCEDURE — 0DBE8ZX EXCISION OF LARGE INTESTINE, VIA NATURAL OR ARTIFICIAL OPENING ENDOSCOPIC, DIAGNOSTIC: ICD-10-PCS | Performed by: INTERNAL MEDICINE

## 2022-12-12 PROCEDURE — 250N000013 HC RX MED GY IP 250 OP 250 PS 637: Performed by: INTERNAL MEDICINE

## 2022-12-12 RX ORDER — NALOXONE HYDROCHLORIDE 0.4 MG/ML
0.2 INJECTION, SOLUTION INTRAMUSCULAR; INTRAVENOUS; SUBCUTANEOUS
Status: DISCONTINUED | OUTPATIENT
Start: 2022-12-12 | End: 2022-12-12 | Stop reason: HOSPADM

## 2022-12-12 RX ORDER — NALOXONE HYDROCHLORIDE 0.4 MG/ML
0.4 INJECTION, SOLUTION INTRAMUSCULAR; INTRAVENOUS; SUBCUTANEOUS
Status: DISCONTINUED | OUTPATIENT
Start: 2022-12-12 | End: 2022-12-12 | Stop reason: HOSPADM

## 2022-12-12 RX ORDER — FLUMAZENIL 0.1 MG/ML
0.2 INJECTION, SOLUTION INTRAVENOUS
Status: DISCONTINUED | OUTPATIENT
Start: 2022-12-12 | End: 2022-12-12 | Stop reason: HOSPADM

## 2022-12-12 RX ORDER — FLUMAZENIL 0.1 MG/ML
0.2 INJECTION, SOLUTION INTRAVENOUS
Status: ACTIVE | OUTPATIENT
Start: 2022-12-12 | End: 2022-12-13

## 2022-12-12 RX ORDER — METHYLPREDNISOLONE SODIUM SUCCINATE 40 MG/ML
20 INJECTION, POWDER, LYOPHILIZED, FOR SOLUTION INTRAMUSCULAR; INTRAVENOUS EVERY 8 HOURS
Status: DISCONTINUED | OUTPATIENT
Start: 2022-12-12 | End: 2022-12-13 | Stop reason: HOSPADM

## 2022-12-12 RX ORDER — DIPHENHYDRAMINE HYDROCHLORIDE 50 MG/ML
25-50 INJECTION INTRAMUSCULAR; INTRAVENOUS
Status: DISCONTINUED | OUTPATIENT
Start: 2022-12-12 | End: 2022-12-12 | Stop reason: HOSPADM

## 2022-12-12 RX ORDER — FENTANYL CITRATE 0.05 MG/ML
50-100 INJECTION, SOLUTION INTRAMUSCULAR; INTRAVENOUS EVERY 5 MIN PRN
Status: DISCONTINUED | OUTPATIENT
Start: 2022-12-12 | End: 2022-12-12 | Stop reason: HOSPADM

## 2022-12-12 RX ORDER — EPINEPHRINE 1 MG/ML
0.1 INJECTION, SOLUTION INTRAMUSCULAR; SUBCUTANEOUS
Status: DISCONTINUED | OUTPATIENT
Start: 2022-12-12 | End: 2022-12-12 | Stop reason: HOSPADM

## 2022-12-12 RX ORDER — LIDOCAINE 40 MG/G
CREAM TOPICAL
Status: DISCONTINUED | OUTPATIENT
Start: 2022-12-12 | End: 2022-12-12 | Stop reason: HOSPADM

## 2022-12-12 RX ORDER — SIMETHICONE 40MG/0.6ML
133 SUSPENSION, DROPS(FINAL DOSAGE FORM)(ML) ORAL
Status: COMPLETED | OUTPATIENT
Start: 2022-12-12 | End: 2022-12-12

## 2022-12-12 RX ORDER — ATROPINE SULFATE 0.1 MG/ML
1 INJECTION INTRAVENOUS
Status: DISCONTINUED | OUTPATIENT
Start: 2022-12-12 | End: 2022-12-12 | Stop reason: HOSPADM

## 2022-12-12 RX ADMIN — METHYLPREDNISOLONE SODIUM SUCCINATE 20 MG: 40 INJECTION, POWDER, FOR SOLUTION INTRAMUSCULAR; INTRAVENOUS at 22:44

## 2022-12-12 RX ADMIN — FENTANYL CITRATE 100 MCG: 0.05 INJECTION, SOLUTION INTRAMUSCULAR; INTRAVENOUS at 12:44

## 2022-12-12 RX ADMIN — MIDAZOLAM HYDROCHLORIDE 2 MG: 1 INJECTION, SOLUTION INTRAMUSCULAR; INTRAVENOUS at 12:44

## 2022-12-12 RX ADMIN — MIDAZOLAM HYDROCHLORIDE 1 MG: 1 INJECTION, SOLUTION INTRAMUSCULAR; INTRAVENOUS at 13:08

## 2022-12-12 RX ADMIN — MIDAZOLAM HYDROCHLORIDE 1 MG: 1 INJECTION, SOLUTION INTRAMUSCULAR; INTRAVENOUS at 13:06

## 2022-12-12 RX ADMIN — TOPICAL ANESTHETIC 0.5 ML: 200 SPRAY DENTAL; PERIODONTAL at 12:44

## 2022-12-12 RX ADMIN — SIMETHICONE 133 MG: 20 EMULSION ORAL at 13:10

## 2022-12-12 RX ADMIN — SODIUM CHLORIDE, POTASSIUM CHLORIDE, SODIUM LACTATE AND CALCIUM CHLORIDE: 600; 310; 30; 20 INJECTION, SOLUTION INTRAVENOUS at 06:04

## 2022-12-12 RX ADMIN — METHYLPREDNISOLONE SODIUM SUCCINATE 20 MG: 40 INJECTION, POWDER, FOR SOLUTION INTRAMUSCULAR; INTRAVENOUS at 14:30

## 2022-12-12 RX ADMIN — FENTANYL CITRATE 50 MCG: 0.05 INJECTION, SOLUTION INTRAMUSCULAR; INTRAVENOUS at 13:06

## 2022-12-12 ASSESSMENT — ACTIVITIES OF DAILY LIVING (ADL)
ADLS_ACUITY_SCORE: 20

## 2022-12-12 NOTE — PLAN OF CARE
Goal Outcome Evaluation:                      RA, ind in room, VSS, clear liquid diet    Pt had endoscopy and colonoscopy today - returned to floor at 1400    Denies pain, pt reports fatigue and currently resting with wife at bedside.     Colonoscopy biopsies taken - awaiting pathology report, solumedrol IV started    Will continue to provide care

## 2022-12-12 NOTE — PLAN OF CARE
[id number]    Pt A/O x4.  VSS and afebrile.  Denies pain. Up independently. Colonoscopy prep done this shift. Plan is endoscopy and colonoscopy 12/12.  Will continue to monitor.

## 2022-12-12 NOTE — PROCEDURES
INPATIENT PRE-PROCEDURE NOTE    CHIEF COMPLAINT / REASON FOR PROCEDURE: vomiting and diarrhea    Admission History and Physical Reviewed, including past medical history, social history family history, ROS, and interval progress notes: on chart-<30 days old; updated within 7 days.    PRE-SEDATION ASSESSMENT:  Lung Exam: normal  Heart Exam: normal  Airway Exam: Normal  Previous reaction to anesthesia/sedation: NO  Sedation plan based on assessment:Moderate (conscious) sedation  ASA Classification: 2 - Mild systemic disease       IMPRESSION: vomiting and diarrhea    PLAN: EGD and colonoscopy    Juliano Rodriguez MD

## 2022-12-12 NOTE — PLAN OF CARE
Goal Outcome Evaluation:      Plan of Care Reviewed With: patient    Overall Patient Progress: improvingOverall Patient Progress: improving     Pt A/O x4. VVS. PIV infusing. Citizen of [place] speaking. Pt denies pain and nausea. Independent in room. Calls appropriately. Voiding good amounts. Colonoscopy prep completed. NPO after midnight maintained. Plan is endoscopy and colonoscopy on 12/12, no official time for procedure yet. Will continue to monitor.

## 2022-12-12 NOTE — PROGRESS NOTES
[facility]    Medicine Progress Note - Hospitalist Service    Date of Admission:  12/10/2022    Assessment & Plan   Paco Ash is a 27 year old [place]-speaking male with no significant past medical history who presents with 2 months of watery diarrhea, hematochezia, emesis.     FTH pancolitis on CT a/p.  Infectious w/u negative.  Inflammatory markers elevated so ongoing concern for inflammatory bowel disease.  Had EGD today showing evidence of inflammatory bowel disease.    pancolitis w/likely inflammatory bowel disease    Hematochezia  -2 months of watery diarrhea.  Intermittently with blood in the stool, toilet bowl, and on toilet paper.  Has developed associated nausea and emesis over 24 to 48 hours PTA.  Denies fevers but does endorse profuse diaphoresis at times.  Endorses 10 pound weight loss as well as significant night sweats.  CT abdomen pelvis shows pancolitis.  CRP and ESR are both elevated.    -EGD on 12/12 without acute findings, colo w/diffuse severe inflammation of entire colon but ileum was normal  -await biopsies, IV solumedrol 20q8 started today by GI  -stop fluids, ok to eat    Anemia  -2/2 above, decreased to 12.4  -monitor, transfuse <7     Diet: Advance Diet as Tolerated: Regular Diet Adult    DVT Prophylaxis: Pneumatic Compression Devices  Lyn Catheter: Not present  Central Lines: None  Cardiac Monitoring: None  Code Status: Full Code      Disposition Plan   Await IV steroids, likley another 24-48 hours     The patient's care was discussed with the Patient.    Kevin Wright DO  Hospitalist Service  [facility]  Securely message with the Vocera Web Console (learn more here)  Text page via [employer] Paging/Directory   ______________________________________________________________________    Interval History   Denies any abd pain, no nausea or vomiting. Denies bloody stools today    Data reviewed today: I reviewed all medications, new  labs and imaging results over the last 24 hours.    Physical Exam   Vital Signs: Temp: 97.8  F (36.6  C) Temp src: Temporal BP: 109/68 Pulse: 77   Resp: 16 SpO2: 96 % O2 Device: None (Room air) Oxygen Delivery: 2 LPM  Weight: 171 lbs 0 oz  Constitutional: awake, alert and cooperative  Eyes: pupils equal, round and reactive to light and conjunctiva normal  ENT: normocepalic, without obvious abnormality, atramatic  Respiratory: no increased work of breathing, good air exchange and clear to auscultation  Cardiovascular: regular rate and rhythm and no murmur noted  GI: normal bowel sounds, soft, non-distended and non-tender  Skin: no bruising or bleeding  Neurologic: alert, oriented x4, no focal deficits    Data   Recent Labs   Lab 12/12/22  0716 12/11/22  0648 12/10/22  1002   WBC 11.0 12.8* 12.0*   HGB 12.4* 12.5* 14.5   MCV 91 89 87    383 445   NA  --  139 139   POTASSIUM  --  4.2 4.0   CHLORIDE  --  106 103   CO2  --  25 25   BUN  --  5.0* 7.4   CR  --  1.01 1.00   ANIONGAP  --  8 11   YASSINE  --  8.7 9.2   GLC  --  93 107*   ALBUMIN  --  3.2* 4.0   PROTTOTAL  --  6.3* 7.7   BILITOTAL  --  0.3 0.3   ALKPHOS  --  76 98   ALT  --  34 29   AST  --  42 30   LIPASE  --   --  22     No results found for this or any previous visit (from the past 24 hour(s)).  Medications       methylPREDNISolone  20 mg Intravenous Q8H

## 2022-12-13 VITALS
TEMPERATURE: 96.7 F | SYSTOLIC BLOOD PRESSURE: 112 MMHG | OXYGEN SATURATION: 95 % | DIASTOLIC BLOOD PRESSURE: 63 MMHG | BODY MASS INDEX: 26.87 KG/M2 | HEIGHT: 66 IN | RESPIRATION RATE: 20 BRPM | HEART RATE: 79 BPM | WEIGHT: 167.2 LBS

## 2022-12-13 LAB
HBV SURFACE AB SERPL IA-ACNC: 0 M[IU]/ML
HBV SURFACE AB SERPL IA-ACNC: NONREACTIVE M[IU]/ML
HBV SURFACE AG SERPL QL IA: NONREACTIVE
PATH REPORT.COMMENTS IMP SPEC: NORMAL
PATH REPORT.FINAL DX SPEC: NORMAL
PATH REPORT.GROSS SPEC: NORMAL
PATH REPORT.MICROSCOPIC SPEC OTHER STN: NORMAL
PATH REPORT.MICROSCOPIC SPEC OTHER STN: NORMAL
PATH REPORT.RELEVANT HX SPEC: NORMAL
PHOTO IMAGE: NORMAL

## 2022-12-13 PROCEDURE — 36415 COLL VENOUS BLD VENIPUNCTURE: CPT | Performed by: PHYSICIAN ASSISTANT

## 2022-12-13 PROCEDURE — 250N000011 HC RX IP 250 OP 636: Performed by: INTERNAL MEDICINE

## 2022-12-13 PROCEDURE — 99239 HOSP IP/OBS DSCHRG MGMT >30: CPT | Performed by: HOSPITALIST

## 2022-12-13 PROCEDURE — 82657 ENZYME CELL ACTIVITY: CPT | Performed by: PHYSICIAN ASSISTANT

## 2022-12-13 PROCEDURE — 86706 HEP B SURFACE ANTIBODY: CPT | Performed by: PHYSICIAN ASSISTANT

## 2022-12-13 PROCEDURE — 87340 HEPATITIS B SURFACE AG IA: CPT | Performed by: PHYSICIAN ASSISTANT

## 2022-12-13 PROCEDURE — 86481 TB AG RESPONSE T-CELL SUSP: CPT | Performed by: PHYSICIAN ASSISTANT

## 2022-12-13 RX ORDER — PREDNISONE 20 MG/1
TABLET ORAL
Qty: 35 TABLET | Refills: 0 | Status: SHIPPED | OUTPATIENT
Start: 2022-12-13 | End: 2023-01-10

## 2022-12-13 RX ADMIN — METHYLPREDNISOLONE SODIUM SUCCINATE 20 MG: 40 INJECTION, POWDER, FOR SOLUTION INTRAMUSCULAR; INTRAVENOUS at 06:12

## 2022-12-13 RX ADMIN — METHYLPREDNISOLONE SODIUM SUCCINATE 20 MG: 40 INJECTION, POWDER, FOR SOLUTION INTRAMUSCULAR; INTRAVENOUS at 13:49

## 2022-12-13 ASSESSMENT — ACTIVITIES OF DAILY LIVING (ADL)
ADLS_ACUITY_SCORE: 20

## 2022-12-13 NOTE — PROGRESS NOTES
"[facility]  [facility]  Gastroenterology Progress note    Interval History:    Communicated to the patient in English- he declined . He is feeling well today, no abdominal pain, n/v. Has had two non-bloody bowel movements since yesterday, this has improved. Discussed UC diagnosis and current steroids as well as plans for outpatient follow up in IBD clinic, all questions were answered.     Vital Signs:      /63 (BP Location: Right arm)   Pulse 79   Temp (!) 96.7  F (35.9  C) (Temporal)   Resp 20   Ht 1.676 m (5' 6\")   Wt 75.8 kg (167 lb 3.2 oz)   SpO2 95%   BMI 26.99 kg/m    Temp (24hrs), Av.4  F (36.3  C), Min:96.7  F (35.9  C), Max:98.1  F (36.7  C)    Patient Vitals for the past 72 hrs:   Weight   22 0700 75.8 kg (167 lb 3.2 oz)   22 1159 77.6 kg (171 lb)   22 0601 77.6 kg (171 lb 1.6 oz)   22 0700 77.2 kg (170 lb 3.2 oz)   12/10/22 1339 78.1 kg (172 lb 1.6 oz)       Intake/Output Summary (Last 24 hours) at 2022 0954  Last data filed at 2022 2247  Gross per 24 hour   Intake 951 ml   Output --   Net 951 ml         Constitutional: NAD, comfortable  Respiratory: Non-labored  Abdomen: soft, non-tender, nondistended, +BS    Additional Comments:  ROS, FH, SH: See initial GI consult for details.    Laboratory Data:  Recent Labs   Lab Test 22  0716 22  0648 12/10/22  1002 16  0345   WBC 11.0 12.8* 12.0* 11.9*   HGB 12.4* 12.5* 14.5 15.8   MCV 91 89 87 86    383 445 247   INR  --   --   --  1.01     Recent Labs   Lab Test 22  0648 12/10/22  1002 16  0345    139 143   POTASSIUM 4.2 4.0 4.1   CHLORIDE 106 103 110*   CO2 25 25 24   BUN 5.0* 7.4 7   CR 1.01 1.00 0.60*   ANIONGAP 8 11 9   YASSINE 8.7 9.2 7.5*     Recent Labs   Lab Test 22  0648 12/10/22  1002 16  0345 16  0245 16  0115   ALBUMIN 3.2* 4.0 3.8  --  4.2   BILITOTAL 0.3 0.3 0.4  --  0.3   ALT " 34 29 23  --  23   AST 42 30 21  --  20   ALKPHOS 76 98 82  --  89   PROTEIN  --   --   --  Negative  --    LIPASE  --  22  --   --  170     Colonoscopy 12/12/22 (Link):  - The examined portion of the ileum was normal.   - Diffuse severe inflammation was found in the entire examined colon secondary to pancolitis ulcerative colitis. Biopsied.     EGD 12/12/22 (Link):  Impression:               - Normal esophagus.                             - Normal stomach. Biopsied.                             - Normal examined duodenum. Biopsied.     IMPRESSION:  1.  Bloody diarrhea with pancolitis on CT- duration of history and negative infectious work-up, suggest underlying inflammatory bowel disease.  His nausea and vomiting is resolved overnight, thus we will pursue colonoscopy prep for full colonoscopy tomorrow.  We will add an upper endoscopy, given the nausea and vomiting initially.     EGD 12/12 normal. Colonoscopy 12/12 showed severe ulcerative pancolitis. Started on solumedrol 20mg q8 . Biopsies are pending. Feels very well today after starting steroids, no abdominal pain or further bloody diarrhea.    Fecal calprotectin at 138. C diff negative. O/P negative.      RECOMMENDATIONS:  -- Will add on hepatitis B, TB quant, TPMT in case biologic therapy needs to be pursued as outpatient.   -- Continue steroids, when discharge transition to oral prednisone taper (start at 40mg x 1 weeks and decrease by 5mg each week).   -- Outpatient IBD clinic follow up discuss maintenance treatment ([facility] will call to schedule)    Will discuss with Dr. Rodriguez.    Josephine Fritz PA-C  [facility]  Cell: [phone] until 12PM  Office: [phone]     \

## 2022-12-13 NOTE — DISCHARGE SUMMARY
[facility]  Hospitalist Discharge Summary      Date of Admission:  12/10/2022  Date of Discharge:  12/13/2022  Discharging Provider: Kevin Wright DO  Discharge Service: Hospitalist Service    Discharge Diagnoses   pancolitis with suspected UC  Hematochezia  anemia    Follow-ups Needed After Discharge   Follow-up Appointments     Follow-up and recommended labs and tests       Follow up with primary care provider, Physician No Ref-Primary, within 7   days for hospital follow- up.  No follow up labs or test are needed.    Follow up with GI in 1-2 weeks        Unresulted Labs Ordered in the Past 30 Days of this Admission     Date and Time Order Name Status Description    12/13/2022 10:22 AM Quantiferon TB Gold Plus Purple Tube In process     12/13/2022 10:22 AM Quantiferon TB Gold Plus Yellow Tube In process     12/13/2022 10:22 AM Quantiferon TB Gold Plus Green Tube In process     12/13/2022 10:22 AM Quantiferon TB Gold Plus Grey Tube In process     12/13/2022  9:54 AM Thiopurine Methyltransferase RBC In process     12/13/2022  9:54 AM Hepatitis B Surface Antibody In process     12/13/2022  9:54 AM Hepatitis B surface antigen In process     12/12/2022 12:54 PM Surgical Pathology Exam In process       These results will be followed up by PCP and GI    Discharge Disposition   Discharged to home  Condition at discharge: Stable    Hospital Course   Paco Ash is a 27 year old [place]ic-speaking male with no significant past medical history who presents with 2 months of watery diarrhea, hematochezia, emesis.     FTH pancolitis on CT a/p.  Infectious w/u negative.  Inflammatory markers elevated so ongoing concern for inflammatory bowel disease.  Had colonoscopy today showing evidence of inflammatory bowel disease. Started on IV steroids with improvement, ok to discharge today on prednisone taper    pancolitis w/likely ulcerative colitis  Hematochezia  -2 months of watery diarrhea.   Intermittently with blood in the stool, toilet bowl, and on toilet paper.   -CT abdomen pelvis shows pancolitis.  CRP and ESR are both elevated.    -EGD on 12/12 without acute findings, colonoscopy w/diffuse severe inflammation of entire colon but ileum was normal  -started on IV solumedrol 20q8 12/12 with symptom improvement, discussed with GI and cont prednisone taper with close outpt follow up as he will likely need biologic therapy as outpt  -slightly different steroid taper due to ease of dosing, 40mg daily then drop 10mg per week  -biopsies pending    Anemia  -2/2 above, mild decrease to 12 range but no further reported bloody stools  -monitor, transfuse <7    Consultations This Hospital Stay   GASTROENTEROLOGY IP CONSULT    Code Status   Full Code    Time Spent on this Encounter   I, Kevin Wright DO, personally saw the patient today and spent greater than 30 minutes discharging this patient.       Kevin Wright DO  [facility]  [address] [address]  Phone: [phone]  Fax: [phone]  ______________________________________________________________________    Physical Exam   Vital Signs: Temp: (!) 96.7  F (35.9  C) Temp src: Temporal BP: 112/63 Pulse: 79   Resp: 20 SpO2: 95 % O2 Device: None (Room air) Oxygen Delivery: 2 LPM  Weight: 167 lbs 3.2 oz  Face to face completed day of discharge       Primary Care Physician   Physician No Ref-Primary    Discharge Orders      Reason for your hospital stay    Presented with bloody diarrhea and found to have likely ulcerative colitis. Cont steroid taper and follow up with GI in 1-2 weeks for further workup and treatment, may need biologic therapy as outpt     Follow-up and recommended labs and tests     Follow up with primary care provider, Physician No Ref-Primary, within 7 days for hospital follow- up.  No follow up labs or test are needed.    Follow up with GI in 1-2 weeks     Activity    Your activity upon  discharge: activity as tolerated     Diet    Follow this diet upon discharge: Orders Placed This Encounter      Advance Diet as Tolerated: Regular Diet Adult       Significant Results and Procedures   Most Recent 3 CBC's:Recent Labs   Lab Test 12/12/22  0716 12/11/22  0648 12/10/22  1002   WBC 11.0 12.8* 12.0*   HGB 12.4* 12.5* 14.5   MCV 91 89 87    383 445     Most Recent 3 BMP's:Recent Labs   Lab Test 12/11/22  0648 12/10/22  1002 02/21/16  0345    139 143   POTASSIUM 4.2 4.0 4.1   CHLORIDE 106 103 110*   CO2 25 25 24   BUN 5.0* 7.4 7   CR 1.01 1.00 0.60*   ANIONGAP 8 11 9   YASSINE 8.7 9.2 7.5*   GLC 93 107* 118*     Most Recent 2 LFT's:Recent Labs   Lab Test 12/11/22  0648 12/10/22  1002   AST 42 30   ALT 34 29   ALKPHOS 76 98   BILITOTAL 0.3 0.3     Most Recent 3 INR's:Recent Labs   Lab Test 02/21/16  0345   INR 1.01   ,   Results for orders placed or performed during the hospital encounter of 12/10/22   CT Abdomen Pelvis w Contrast    Narrative    EXAM: CT ABDOMEN PELVIS W CONTRAST  LOCATION: [facility]  DATE/TIME: 12/10/2022 10:34 AM    INDICATION: abd pain and vomitng  COMPARISON: None.  TECHNIQUE: CT scan of the abdomen and pelvis was performed following injection of IV contrast. Multiplanar reformats were obtained. Dose reduction techniques were used.  CONTRAST: 70mL Isovue 370    FINDINGS:   LOWER CHEST: Trace dependent atelectasis.    HEPATOBILIARY: Normal.    PANCREAS: Normal.    SPLEEN: Normal.    ADRENAL GLANDS: Normal.    KIDNEYS/BLADDER: Normal.    BOWEL: Diffuse colonic wall thickening, greatest in the rectosigmoid region, is consistent with colitis. No obstruction. Normal appendix.    LYMPH NODES: Mesorectal lymphadenopathy with the largest node measuring 0.8 cm in short axis, image 174:2. Multiple small perirectal and mesenteric lymph nodes which are likely reactive.    VASCULATURE: Unremarkable.    PELVIC ORGANS: Normal.    MUSCULOSKELETAL: Normal.       Impression    IMPRESSION:   1.  Pancolitis which is either infectious or inflammatory.  2.  Reactive mesorectal and mesenteric lymphadenopathy.       Discharge Medications   Current Discharge Medication List      START taking these medications    Details   predniSONE (DELTASONE) 20 MG tablet Take 2 tablets (40 mg) by mouth daily for 7 days, THEN 1.5 tablets (30 mg) daily for 7 days, THEN 1 tablet (20 mg) daily for 7 days, THEN 0.5 tablets (10 mg) daily for 7 days.  Qty: 35 tablet, Refills: 0    Associated Diagnoses: Pancolitis (H)         CONTINUE these medications which have NOT CHANGED    Details   loperamide (IMODIUM) 2 MG capsule Take 2 mg by mouth 4 times daily as needed for diarrhea           Allergies   No Known Allergies

## 2022-12-13 NOTE — PLAN OF CARE
Pt is A&O x4. VSS. On RA. Denies pain. Abdomen is soft, nontender. IV SL. On regular diet. Ambulates independently in the room. Had the pt on bed alarm at night, witnessed him being steady on his feet while ambulating. Daily weight. From home with wife. Possible discharge on 12/13 to home.

## 2022-12-13 NOTE — PLAN OF CARE
Goal Outcome Evaluation:                 PM SHIFT  Pt alert and orientated. Pt tolerating regular diet. Pt complains of abd pain only with palpitation. Pt up in room ind. Pt family visited this shift. Plan is home and maybe tomorrow. Pt still on IV solumedrol.

## 2022-12-15 LAB
GAMMA INTERFERON BACKGROUND BLD IA-ACNC: 0.02 IU/ML
M TB IFN-G BLD-IMP: NEGATIVE
M TB IFN-G CD4+ BCKGRND COR BLD-ACNC: 1.31 IU/ML
MITOGEN IGNF BCKGRD COR BLD-ACNC: 0 IU/ML
MITOGEN IGNF BCKGRD COR BLD-ACNC: 0.01 IU/ML
QUANTIFERON MITOGEN: 1.33 IU/ML
QUANTIFERON NIL TUBE: 0.02 IU/ML
QUANTIFERON TB1 TUBE: 0.02 IU/ML
QUANTIFERON TB2 TUBE: 0.03

## 2022-12-17 LAB — TPMT BLD-CCNC: 27.2 U/ML

## 2023-06-03 ENCOUNTER — HEALTH MAINTENANCE LETTER (OUTPATIENT)
Age: 28
End: 2023-06-03

## 2024-04-30 ENCOUNTER — MEDICAL CORRESPONDENCE (OUTPATIENT)
Dept: HEALTH INFORMATION MANAGEMENT | Facility: CLINIC | Age: 29
End: 2024-04-30
Payer: COMMERCIAL

## 2024-07-13 ENCOUNTER — HEALTH MAINTENANCE LETTER (OUTPATIENT)
Age: 29
End: 2024-07-13

## 2025-07-19 ENCOUNTER — HEALTH MAINTENANCE LETTER (OUTPATIENT)
Age: 30
End: 2025-07-19

## (undated) DEVICE — KIT ENDO TURNOVER/PROCEDURE W/CLEAN A SCOPE LINERS 103888

## (undated) DEVICE — ENDO FORCEP ENDOJAW BIOPSY 2.8MMX160CM FB-220K

## (undated) DEVICE — ENDO FORCEP ENDOJAW BIOPSY 2.8MMX230CM FB-220U